# Patient Record
Sex: MALE | Race: BLACK OR AFRICAN AMERICAN | Employment: OTHER | URBAN - METROPOLITAN AREA
[De-identification: names, ages, dates, MRNs, and addresses within clinical notes are randomized per-mention and may not be internally consistent; named-entity substitution may affect disease eponyms.]

---

## 2018-05-24 ENCOUNTER — ANESTHESIA (OUTPATIENT)
Dept: ENDOSCOPY | Age: 78
End: 2018-05-24
Payer: MEDICARE

## 2018-05-24 ENCOUNTER — HOSPITAL ENCOUNTER (OUTPATIENT)
Age: 78
Setting detail: OUTPATIENT SURGERY
Discharge: HOME | End: 2018-05-24
Attending: SPECIALIST | Admitting: SPECIALIST
Payer: MEDICARE

## 2018-05-24 ENCOUNTER — ANESTHESIA EVENT (OUTPATIENT)
Dept: ENDOSCOPY | Age: 78
End: 2018-05-24
Payer: MEDICARE

## 2018-05-24 VITALS
SYSTOLIC BLOOD PRESSURE: 133 MMHG | BODY MASS INDEX: 22.4 KG/M2 | OXYGEN SATURATION: 97 % | HEIGHT: 71 IN | WEIGHT: 160 LBS | DIASTOLIC BLOOD PRESSURE: 79 MMHG | HEART RATE: 72 BPM | RESPIRATION RATE: 13 BRPM | TEMPERATURE: 97.9 F

## 2018-05-24 PROCEDURE — 74011250636 HC RX REV CODE- 250/636

## 2018-05-24 PROCEDURE — 77030027957 HC TBNG IRR ENDOGTR BUSS -B: Performed by: SPECIALIST

## 2018-05-24 PROCEDURE — 76040000019: Performed by: SPECIALIST

## 2018-05-24 PROCEDURE — 76060000031 HC ANESTHESIA FIRST 0.5 HR: Performed by: SPECIALIST

## 2018-05-24 RX ORDER — METOPROLOL TARTRATE 100 MG/1
100 TABLET ORAL 2 TIMES DAILY
COMMUNITY

## 2018-05-24 RX ORDER — METFORMIN HYDROCHLORIDE 1000 MG/1
1000 TABLET ORAL 2 TIMES DAILY WITH MEALS
COMMUNITY

## 2018-05-24 RX ORDER — CLOPIDOGREL BISULFATE 75 MG/1
75 TABLET ORAL DAILY
COMMUNITY

## 2018-05-24 RX ORDER — ROSUVASTATIN CALCIUM 20 MG/1
20 TABLET, COATED ORAL DAILY
COMMUNITY

## 2018-05-24 RX ORDER — OMEPRAZOLE 40 MG/1
40 CAPSULE, DELAYED RELEASE ORAL DAILY
COMMUNITY
End: 2018-07-09

## 2018-05-24 RX ORDER — PROPOFOL 10 MG/ML
INJECTION, EMULSION INTRAVENOUS AS NEEDED
Status: DISCONTINUED | OUTPATIENT
Start: 2018-05-24 | End: 2018-05-24 | Stop reason: HOSPADM

## 2018-05-24 RX ORDER — LISINOPRIL 40 MG/1
40 TABLET ORAL DAILY
COMMUNITY

## 2018-05-24 RX ORDER — SODIUM CHLORIDE 9 MG/ML
INJECTION, SOLUTION INTRAVENOUS
Status: DISCONTINUED | OUTPATIENT
Start: 2018-05-24 | End: 2018-05-24 | Stop reason: HOSPADM

## 2018-05-24 RX ORDER — HYDROCHLOROTHIAZIDE 25 MG/1
25 TABLET ORAL DAILY
COMMUNITY

## 2018-05-24 RX ADMIN — PROPOFOL 30 MG: 10 INJECTION, EMULSION INTRAVENOUS at 15:58

## 2018-05-24 RX ADMIN — SODIUM CHLORIDE: 9 INJECTION, SOLUTION INTRAVENOUS at 15:53

## 2018-05-24 RX ADMIN — PROPOFOL 50 MG: 10 INJECTION, EMULSION INTRAVENOUS at 16:01

## 2018-05-24 RX ADMIN — PROPOFOL 70 MG: 10 INJECTION, EMULSION INTRAVENOUS at 15:57

## 2018-05-24 NOTE — PROGRESS NOTES
Spoke with Dr. Marian Mistry regarding pt taking plavix yesterday. Pt also states he drank his prep at 6pm, & ate a spaghetti dinner at 8pm last night, however, he states his stool is liquid at this time. Explained to pt that we will not be able to remove polyps today due to anticoagulation & offered for him to reschedule. He states he would prefer to attempt procedure today. Dr. Cynthia Boyd at bedside to place IV.

## 2018-05-24 NOTE — H&P
Colonoscopy History and Physical      The patient was seen and examined. Date of last colonoscopy: none, Polyps  No      The airway was assessed and documented. The problem list, past medical history, and medications were reviewed. There is no problem list on file for this patient. Social History     Social History    Marital status: SINGLE     Spouse name: N/A    Number of children: N/A    Years of education: N/A     Occupational History    Not on file. Social History Main Topics    Smoking status: Current Every Day Smoker     Packs/day: 0.50     Years: 65.00    Smokeless tobacco: Never Used    Alcohol use No    Drug use: Not on file    Sexual activity: Not on file     Other Topics Concern    Not on file     Social History Narrative    No narrative on file     Past Medical History:   Diagnosis Date    [facility]     Atherosclerotic cerebrovascular disease     Chronic kidney disease     Diabetes ([facility]     Dizziness     Hypertension     Stroke ([facility] [facility]     weaker right leg    Thrombocytosis ([facility] [address] [facility]          Prior to Admission Medications   Prescriptions Last Dose Informant Patient Reported? Taking? clopidogrel (PLAVIX) 75 mg tab 2018 at am  Yes Yes   Sig: Take 75 mg by mouth daily. hydroCHLOROthiazide (HYDRODIURIL) 25 mg tablet 2018 at Unknown time  Yes Yes   Sig: Take 25 mg by mouth daily. insulin detemir U-100 (LEVEMIR FLEXTOUCH U-100 INSULN) 100 unit/mL (3 mL) inpn 2018 at Unknown time  Yes Yes   Si Units by SubCUTAneous route daily. lisinopril (PRINIVIL, ZESTRIL) 40 mg tablet 2018 at Unknown time  Yes Yes   Sig: Take 40 mg by mouth daily. metFORMIN (GLUCOPHAGE) 1,000 mg tablet 2018 at Unknown time  Yes Yes   Sig: Take 1,000 mg by mouth two (2) times daily (with meals). metoprolol tartrate (LOPRESSOR) 100 mg IR tablet 2018 at Unknown time  Yes Yes   Sig: Take  by mouth two (2) times a day.    omeprazole (PRILOSEC) 40 mg capsule 5/23/2018 at Unknown time  Yes Yes   Sig: Take 40 mg by mouth daily. rosuvastatin (CRESTOR) 20 mg tablet 5/23/2018 at Unknown time  Yes Yes   Sig: Take 20 mg by mouth daily. Facility-Administered Medications: None       The patient was seen and examined in the endoscopy suite. The airway was assessed and docuemented. The problem list and medications were reviewed. Chief complaint, history of present illness, and review of systems and Past medical History are positive for: change in bowel habits and weight loss. The heart, lungs and mental status were satisfactory for the administration of sedation and for the procedure. I discussed with the patient the objectives, risks, consequences and alternatives to the procedure.      Plan: Endoscopic procedure with sedation     Abel Azul MD   5/24/2018  3:58 PM

## 2018-05-24 NOTE — PROGRESS NOTES
Received report from anesthesia staff on vital signs and status of patient.     Scope precleaned at bedside by Carolin Torres

## 2018-05-24 NOTE — ANESTHESIA POSTPROCEDURE EVALUATION
Post-Anesthesia Evaluation and Assessment    Patient: Thuy Aaron MRN: [id number]  SSN: [id number]    YOB: 1940  Age: 68 y.o. Sex: male       Cardiovascular Function/Vital Signs  Visit Vitals    /74    Pulse 77    Temp 36.5 °C (97.7 °F)    Resp 16    Ht 5' 11\" (1.803 m)    Wt 72.6 kg (160 lb)    SpO2 98%    BMI 22.32 kg/m2       Patient is status post MAC anesthesia for Procedure(s):  SIGMOIDOSCOPY FLEXIBLE. Nausea/Vomiting: None    Postoperative hydration reviewed and adequate. Pain:  Pain Scale 1: Numeric (0 - 10) (05/24/18 1531)  Pain Intensity 1: 0 (05/24/18 1531)   Managed    Neurological Status: At baseline    Mental Status and Level of Consciousness: Arousable    Pulmonary Status:   O2 Device: Nasal cannula (05/24/18 1606)   Adequate oxygenation and airway patent    Complications related to anesthesia: None    Post-anesthesia assessment completed.  No concerns    Signed By: Rojelio Melo MD     May 24, 2018

## 2018-05-24 NOTE — ANESTHESIA PREPROCEDURE EVALUATION
Anesthetic History   No history of anesthetic complications            Review of Systems / Medical History  Patient summary reviewed, nursing notes reviewed and pertinent labs reviewed    Pulmonary          Smoker         Neuro/Psych       CVA (mild right sided weakness)       Cardiovascular    Hypertension          PAD    Exercise tolerance: >4 METS     GI/Hepatic/Renal         Renal disease: CRI       Endo/Other    Diabetes         Other Findings              Physical Exam    Airway  Mallampati: II  TM Distance: 4 - 6 cm  Neck ROM: normal range of motion   Mouth opening: Normal     Cardiovascular    Rhythm: regular  Rate: normal         Dental    Dentition: Full upper dentures and Poor dentition     Pulmonary  Breath sounds clear to auscultation               Abdominal  GI exam deferred       Other Findings            Anesthetic Plan    ASA: 3  Anesthesia type: MAC          Induction: Intravenous  Anesthetic plan and risks discussed with: Patient

## 2018-05-24 NOTE — PROCEDURES
[address]  [address]                 Colonoscopy Procedure Note    Indications:   See Preoperative Diagnosis above  Referring Physician: Yohannes Verdugo MD  Anesthesia/Sedation: MAC anesthesia Propofol  Endoscopist:  Dr. Berenice Renee  Assistant:  Endoscopy Technician-1: Chalino Ferrell  Endoscopy RN-1: Terra Haskins RN  Preoperative diagnosis: COLON  Postoperative diagnosis: Incomplete Prep    Procedure in Detail:  Informed consent was obtained for the procedure, including sedation. Risks of perforation, hemorrhage, adverse drug reaction, and aspiration were discussed. The patient was placed in the left lateral decubitus position. Based on the pre-procedure assessment, including review of the patient's medical history, medications, allergies, and review of systems, he had been deemed to be an appropriate candidate for moderate sedation; he was therefore sedated with the medications listed above. The patient was monitored continuously with ECG tracing, pulse oximetry, blood pressure monitoring, and direct observations. A rectal examination was performed. The [id number] was inserted into the rectum and advanced under direct vision to the sigmoid colon. The quality of the colonic preparation was poor. A careful inspection was made as the colonoscope was withdrawn, including a retroflexed view of the rectum; findings and interventions are described below. Appropriate photodocumentation was not obtained. Findings:  Rectum: poor prep  Sigmoid: poor prep, procedure terminated in sigmoid colon. Specimens:    none    EBL: None    Complications: None; patient tolerated the procedure well.     Recommendations:   Patient was instructed to call my office to reschedule colonoscopy and stop Plavix 5 days before colonoscopy        Signed By: Berenice Renee MD                        May 24, 2018

## 2018-05-24 NOTE — DISCHARGE INSTRUCTIONS
Yvette Kenney  [id number]  1940    COLON DISCHARGE INSTRUCTIONS  Discomfort:  Redness at IV site- apply warm compress to area; if redness or soreness persist- contact your physician  There may be a slight amount of blood passed from the rectum  Gaseous discomfort- walking, belching will help relieve any discomfort  You may not operate a vehicle for 12 hours  You may not engage in an occupation involving machinery or appliances for rest of today  You may not drink alcoholic beverages for at least 12 hours  Avoid making any critical decisions for at least 24 hour  DIET:   Regular diet. - however -  remember your colon is empty and a heavy meal will produce gas. Avoid these foods:  vegetables, fried / greasy foods, carbonated drinks for today. MEDICATIONS:      Regarding Aspirin or Nonsteroidal medications, please see below. ACTIVITY:  You may resume your normal daily activities it is recommended that you spend the remainder of the day resting -  avoid any strenuous activity. CALL M.D. ANY SIGN OF:  Increasing pain, nausea, vomiting  Abdominal distension (swelling)  New increased bleeding (oral or rectal)  Fever (chills)  Pain in chest area  Bloody discharge from nose or mouth  Shortness of breath    You may  take any Advil, Aspirin, Ibuprofen, Motrin, Aleve, or   Tylenol as needed for pain. Follow-up Instructions:   Call Dr. Albertina Senior office to reschedule colonoscopy and stop Plavix 5 days before colonoscopy.       DISCHARGE SUMMARY from Nurse    The following personal items collected during your admission are returned to you:   Dental Appliance: Dental Appliances: None  Vision: Visual Aid: Glasses (in bag, per pt)  Hearing Aid:    Jewelry:    Clothing:    Other Valuables:    Valuables sent to safe:

## 2018-05-24 NOTE — IP AVS SNAPSHOT
[address] [facility] [address] [place] [address] 
[phone] Patient: Jairo Avila. MRN: [id number] About your hospitalization You were admitted on:  May 24, 2018 You last received care in the:  [facility] ENDOSCOPY You were discharged on:  May 24, 2018 Why you were hospitalized Your primary diagnosis was:  Not on File Follow-up Information None Discharge Orders None A check raj indicates which time of day the medication should be taken. My Medications CONTINUE taking these medications Instructions Each Dose to Equal  
 Morning Noon Evening Bedtime  
 clopidogrel 75 mg Tab Commonly known as:  PLAVIX Your last dose was: Your next dose is: Take 75 mg by mouth daily. 75 mg  
    
   
   
   
  
 hydroCHLOROthiazide 25 mg tablet Commonly known as:  HYDRODIURIL Your last dose was: Your next dose is: Take 25 mg by mouth daily. 25 mg  
    
   
   
   
  
 LEVEMIR FLEXTOUCH U-100 INSULN 100 unit/mL (3 mL) Inpn Generic drug:  insulin detemir U-100 Your last dose was: Your next dose is:    
   
   
 38 Units by SubCUTAneous route daily. 38 Units  
    
   
   
   
  
 lisinopril 40 mg tablet Commonly known as:  Riana Dowdy Your last dose was: Your next dose is: Take 40 mg by mouth daily. 40 mg  
    
   
   
   
  
 metFORMIN 1,000 mg tablet Commonly known as:  GLUCOPHAGE Your last dose was: Your next dose is: Take 1,000 mg by mouth two (2) times daily (with meals). 1000 mg  
    
   
   
   
  
 metoprolol tartrate 100 mg IR tablet Commonly known as:  LOPRESSOR Your last dose was: Your next dose is: Take  by mouth two (2) times a day. omeprazole 40 mg capsule Commonly known as:  PRILOSEC  
   
 Your last dose was: Your next dose is: Take 40 mg by mouth daily. 40 mg  
    
   
   
   
  
 rosuvastatin 20 mg tablet Commonly known as:  CRESTOR Your last dose was: Your next dose is: Take 20 mg by mouth daily. 20 mg Discharge Instructions Venancio Mckenzie. 
[id number] 
1940 COLON DISCHARGE INSTRUCTIONS Discomfort: 
Redness at IV site- apply warm compress to area; if redness or soreness persist- contact your physician There may be a slight amount of blood passed from the rectum Gaseous discomfort- walking, belching will help relieve any discomfort You may not operate a vehicle for 12 hours You may not engage in an occupation involving machinery or appliances for rest of today You may not drink alcoholic beverages for at least 12 hours Avoid making any critical decisions for at least 24 hour DIET: 
 Regular diet.  however -  remember your colon is empty and a heavy meal will produce gas. Avoid these foods:  vegetables, fried / greasy foods, carbonated drinks for today. MEDICATIONS: 
  
 Regarding Aspirin or Nonsteroidal medications, please see below. ACTIVITY: 
You may resume your normal daily activities it is recommended that you spend the remainder of the day resting -  avoid any strenuous activity. CALL M.D. ANY SIGN OF: Increasing pain, nausea, vomiting Abdominal distension (swelling) New increased bleeding (oral or rectal) Fever (chills) Pain in chest area Bloody discharge from nose or mouth Shortness of breath You may  take any Advil, Aspirin, Ibuprofen, Motrin, Aleve, or   Tylenol as needed for pain. Follow-up Instructions: 
 Call Dr. Caroline Segovia office to reschedule colonoscopy and stop Plavix 5 days before colonoscopy. DISCHARGE SUMMARY from Nurse The following personal items collected during your admission are returned to you: Dental Appliance: Dental Appliances: None Vision: Visual Aid: Glasses (in bag, per pt) Hearing Aid:   
Jewelry:   
Clothing:   
Other Valuables:   
Valuables sent to safe:   
 
 
 
 
  
  
  
Introducing [facility]! [employer] introduces 19pay patient portal. Now you can access parts of your medical record, email your doctor's office, and request medication refills online. 1. In your internet browser, go to [email or web address]. [place]/[employer] 2. Click on the First Time User? Click Here link in the Sign In box. You will see the New Member Sign Up page. 3. Enter your 19pay Access Code exactly as it appears below. You will not need to use this code after youve completed the sign-up process. If you do not sign up before the expiration date, you must request a new code. · 19pay Access Code: [email or web address] Expires: 8/22/2018  4:23 PM 
 
4. Enter the last four digits of your Social Security Number (xxxx) and Date of Birth (mm/dd/yyyy) as indicated and click Submit. You will be taken to the next sign-up page. 5. Create a 19pay ID. This will be your 19pay login ID and cannot be changed, so think of one that is secure and easy to remember. 6. Create a 19pay password. You can change your password at any time. 7. Enter your Password Reset Question and Answer. This can be used at a later time if you forget your password. 8. Enter your e-mail address. You will receive e-mail notification when new information is available in [address] 9. Click Sign Up. You can now view and download portions of your medical record. 10. Click the Download Summary menu link to download a portable copy of your medical information. If you have questions, please visit the Frequently Asked Questions section of the 19pay website. Remember, 19pay is NOT to be used for urgent needs. For medical emergencies, dial 911. Now available from your iPhone and Android! Introducing [employer] As a [employer] patient, I wanted to make you aware of our electronic visit tool called [employer]. [employer] 24/7 allows you to connect within minutes with a medical provider 24 hours a day, seven days a week via a mobile device or tablet or logging into a secure website from your computer. You can access [employer] from anywhere in the [place]. A virtual visit might be right for you when you have a simple condition and feel like you just dont want to get out of bed, or cant get away from work for an appointment, when your regular [employer] provider is not available (evenings, weekends or holidays), or when youre out of town and need minor care. Electronic visits cost only $49 and if the [employer] 24/7 provider determines a prescription is needed to treat your condition, one can be electronically transmitted to a nearby pharmacy*. Please take a moment to enroll today if you have not already done so. The enrollment process is free and takes just a few minutes. To enroll, please download the [employer] 24/7 iker to your tablet or phone, or visit [email or web address] to enroll on your computer. And, as an [address] [place] [address] patient with a [employer] account, the results of your visits will be scanned into your electronic medical record and your primary care provider will be able to view the scanned results. We urge you to continue to see your regular [employer] provider for your ongoing medical care. And while your primary care provider may not be the one available when you seek a [employer] virtual visit, the peace of mind you get from getting a real diagnosis real time can be priceless. For more information on [employer], view our Frequently Asked Questions (FAQs) at [email or web address]. Sincerely, 
 
Ginny Pathak MD 
Chief Medical Officer [facility] *:  certain medications cannot be prescribed via [employer] Providers Seen During Your Hospitalization Provider Specialty Primary office phone Cherise Schulz MD Gastroenterology [phone] Your Primary Care Physician (PCP) Primary Care Physician Office Phone Office Fax Carrol Cordon [phone] You are allergic to the following No active allergies Recent Documentation Height Weight BMI Smoking Status 1.803 m 72.6 kg 22.32 kg/m2 Current Every Day Smoker Emergency Contacts Name Discharge Info Relation Home Work Mobile [address] CAREGIVER [3] Son [22] [phone] Patient Belongings The following personal items are in your possession at time of discharge: 
  Dental Appliances: None  Visual Aid: Glasses (in bag, per pt) Please provide this summary of care documentation to your next provider. Signatures-by signing, you are acknowledging that this After Visit Summary has been reviewed with you and you have received a copy. Patient Signature:  ____________________________________________________________ Date:  ____________________________________________________________  
  
Mariana Alexander Provider Signature:  ____________________________________________________________ Date:  ____________________________________________________________

## 2018-08-14 ENCOUNTER — HOSPITAL ENCOUNTER (OUTPATIENT)
Age: 78
Setting detail: OUTPATIENT SURGERY
Discharge: HOME | End: 2018-08-14
Attending: SPECIALIST | Admitting: SPECIALIST
Payer: MEDICARE

## 2018-08-14 ENCOUNTER — ANESTHESIA EVENT (OUTPATIENT)
Dept: ENDOSCOPY | Age: 78
End: 2018-08-14
Payer: MEDICARE

## 2018-08-14 ENCOUNTER — ANESTHESIA (OUTPATIENT)
Dept: ENDOSCOPY | Age: 78
End: 2018-08-14
Payer: MEDICARE

## 2018-08-14 VITALS
DIASTOLIC BLOOD PRESSURE: 77 MMHG | HEART RATE: 67 BPM | RESPIRATION RATE: 15 BRPM | SYSTOLIC BLOOD PRESSURE: 140 MMHG | OXYGEN SATURATION: 98 % | TEMPERATURE: 97.5 F

## 2018-08-14 LAB
GLUCOSE BLD STRIP.AUTO-MCNC: 137 MG/DL (ref 65–100)
GLUCOSE BLD STRIP.AUTO-MCNC: 66 MG/DL (ref 65–100)
SERVICE CMNT-IMP: ABNORMAL
SERVICE CMNT-IMP: NORMAL

## 2018-08-14 PROCEDURE — 77030027957 HC TBNG IRR ENDOGTR BUSS -B: Performed by: SPECIALIST

## 2018-08-14 PROCEDURE — 76040000019: Performed by: SPECIALIST

## 2018-08-14 PROCEDURE — 76060000031 HC ANESTHESIA FIRST 0.5 HR: Performed by: SPECIALIST

## 2018-08-14 PROCEDURE — 74011250636 HC RX REV CODE- 250/636

## 2018-08-14 PROCEDURE — 82962 GLUCOSE BLOOD TEST: CPT

## 2018-08-14 RX ORDER — MIDAZOLAM HYDROCHLORIDE 1 MG/ML
.25-1 INJECTION, SOLUTION INTRAMUSCULAR; INTRAVENOUS
Status: DISCONTINUED | OUTPATIENT
Start: 2018-08-14 | End: 2018-08-14 | Stop reason: HOSPADM

## 2018-08-14 RX ORDER — PROPOFOL 10 MG/ML
INJECTION, EMULSION INTRAVENOUS AS NEEDED
Status: DISCONTINUED | OUTPATIENT
Start: 2018-08-14 | End: 2018-08-14 | Stop reason: HOSPADM

## 2018-08-14 RX ORDER — LIDOCAINE HYDROCHLORIDE 20 MG/ML
INJECTION, SOLUTION EPIDURAL; INFILTRATION; INTRACAUDAL; PERINEURAL AS NEEDED
Status: DISCONTINUED | OUTPATIENT
Start: 2018-08-14 | End: 2018-08-14 | Stop reason: HOSPADM

## 2018-08-14 RX ORDER — EPINEPHRINE 0.1 MG/ML
1 INJECTION INTRACARDIAC; INTRAVENOUS
Status: DISCONTINUED | OUTPATIENT
Start: 2018-08-14 | End: 2018-08-14 | Stop reason: HOSPADM

## 2018-08-14 RX ORDER — FENTANYL CITRATE 50 UG/ML
200 INJECTION, SOLUTION INTRAMUSCULAR; INTRAVENOUS
Status: DISCONTINUED | OUTPATIENT
Start: 2018-08-14 | End: 2018-08-14 | Stop reason: HOSPADM

## 2018-08-14 RX ORDER — SODIUM CHLORIDE 9 MG/ML
50 INJECTION, SOLUTION INTRAVENOUS CONTINUOUS
Status: DISCONTINUED | OUTPATIENT
Start: 2018-08-14 | End: 2018-08-14 | Stop reason: HOSPADM

## 2018-08-14 RX ORDER — FLUMAZENIL 0.1 MG/ML
0.2 INJECTION INTRAVENOUS
Status: DISCONTINUED | OUTPATIENT
Start: 2018-08-14 | End: 2018-08-14 | Stop reason: HOSPADM

## 2018-08-14 RX ORDER — ATROPINE SULFATE 0.1 MG/ML
0.5 INJECTION INTRAVENOUS
Status: DISCONTINUED | OUTPATIENT
Start: 2018-08-14 | End: 2018-08-14 | Stop reason: HOSPADM

## 2018-08-14 RX ORDER — DEXTROMETHORPHAN/PSEUDOEPHED 2.5-7.5/.8
1.2 DROPS ORAL
Status: DISCONTINUED | OUTPATIENT
Start: 2018-08-14 | End: 2018-08-14 | Stop reason: HOSPADM

## 2018-08-14 RX ORDER — SODIUM CHLORIDE 9 MG/ML
INJECTION, SOLUTION INTRAVENOUS
Status: DISCONTINUED | OUTPATIENT
Start: 2018-08-14 | End: 2018-08-14 | Stop reason: HOSPADM

## 2018-08-14 RX ORDER — SODIUM CHLORIDE 0.9 % (FLUSH) 0.9 %
5-10 SYRINGE (ML) INJECTION AS NEEDED
Status: DISCONTINUED | OUTPATIENT
Start: 2018-08-14 | End: 2018-08-14 | Stop reason: HOSPADM

## 2018-08-14 RX ORDER — NALOXONE HYDROCHLORIDE 0.4 MG/ML
0.4 INJECTION, SOLUTION INTRAMUSCULAR; INTRAVENOUS; SUBCUTANEOUS
Status: DISCONTINUED | OUTPATIENT
Start: 2018-08-14 | End: 2018-08-14 | Stop reason: HOSPADM

## 2018-08-14 RX ORDER — SODIUM CHLORIDE 0.9 % (FLUSH) 0.9 %
5-10 SYRINGE (ML) INJECTION EVERY 8 HOURS
Status: DISCONTINUED | OUTPATIENT
Start: 2018-08-14 | End: 2018-08-14 | Stop reason: HOSPADM

## 2018-08-14 RX ADMIN — PROPOFOL 50 MG: 10 INJECTION, EMULSION INTRAVENOUS at 13:10

## 2018-08-14 RX ADMIN — LIDOCAINE HYDROCHLORIDE 40 MG: 20 INJECTION, SOLUTION EPIDURAL; INFILTRATION; INTRACAUDAL; PERINEURAL at 13:10

## 2018-08-14 RX ADMIN — SODIUM CHLORIDE: 9 INJECTION, SOLUTION INTRAVENOUS at 12:59

## 2018-08-14 NOTE — H&P
Colonoscopy History and Physical      The patient was seen and examined. Date of last colonoscopy: , Polyps  No      The airway was assessed and documented. The problem list, past medical history, and medications were reviewed. There is no problem list on file for this patient. Social History     Social History    Marital status: SINGLE     Spouse name: N/A    Number of children: N/A    Years of education: N/A     Occupational History    Not on file. Social History Main Topics    Smoking status: Current Every Day Smoker     Packs/day: 0.50     Years: 65.00    Smokeless tobacco: Never Used    Alcohol use No    Drug use: Yes     Special: IV, Heroin      Comment: quit     Sexual activity: Not on file     Other Topics Concern    Not on file     Social History Narrative     Past Medical History:   Diagnosis Date    Aortic atherosclerosis ([facility] [address]     Atherosclerotic cerebrovascular disease     Chronic kidney disease     Diabetes ([facility] [facility]     Dizziness     Hypertension     Stroke ([facility] [address]     weaker right leg    Thrombocytosis ([facility] [address] [facility]          Prior to Admission Medications   Prescriptions Last Dose Informant Patient Reported? Taking? amLODIPine (NORVASC) 10 mg tablet 2018 at Unknown time  Yes Yes   Sig: Take 10 mg by mouth daily. clopidogrel (PLAVIX) 75 mg tab 2018  Yes No   Sig: Take 75 mg by mouth daily. hydroCHLOROthiazide (HYDRODIURIL) 25 mg tablet 2018 at Unknown time  Yes Yes   Sig: Take 25 mg by mouth daily. insulin detemir U-100 (LEVEMIR FLEXTOUCH U-100 INSULN) 100 unit/mL (3 mL) inpn 2018 at Unknown time  Yes Yes   Si Units by SubCUTAneous route nightly. lisinopril (PRINIVIL, ZESTRIL) 40 mg tablet 2018 at Unknown time  Yes Yes   Sig: Take 40 mg by mouth daily. metFORMIN (GLUCOPHAGE) 1,000 mg tablet 2018 at Unknown time  Yes Yes   Sig: Take 1,000 mg by mouth two (2) times daily (with meals).    metoprolol tartrate (LOPRESSOR) 100 mg IR tablet 8/14/2018 at Unknown time  Yes Yes   Sig: Take 100 mg by mouth two (2) times a day. omeprazole (PRILOSEC) 20 mg capsule 8/14/2018 at Unknown time  Yes Yes   Sig: Take 20 mg by mouth daily. rosuvastatin (CRESTOR) 20 mg tablet 8/14/2018 at Unknown time  Yes Yes   Sig: Take 20 mg by mouth daily. Facility-Administered Medications: None       The patient was seen and examined in the endoscopy suite. The airway was assessed and docuemented. The problem list and medications were reviewed. Chief complaint, history of present illness, and review of systems and Past medical History are positive for: change in bowel habits, stroke. and weight loss    The heart, lungs and mental status were satisfactory for the administration of sedation and for the procedure. I discussed with the patient the objectives, risks, consequences and alternatives to the procedure.      Plan: Endoscopic procedure with sedation     Epi Beck MD   8/14/2018  1:10 PM

## 2018-08-14 NOTE — DISCHARGE INSTRUCTIONS
Nay Pan  [id number]  1940    COLON DISCHARGE INSTRUCTIONS  Discomfort:  Redness at IV site- apply warm compress to area; if redness or soreness persist- contact your physician  There may be a slight amount of blood passed from the rectum  Gaseous discomfort- walking, belching will help relieve any discomfort  You may not operate a vehicle for 12 hours  You may not engage in an occupation involving machinery or appliances for rest of today  You may not drink alcoholic beverages for at least 12 hours  Avoid making any critical decisions for at least 24 hour  DIET:   Regular diet. - however -  remember your colon is empty and a heavy meal will produce gas. Avoid these foods:  vegetables, fried / greasy foods, carbonated drinks for today. MEDICATIONS:      Regarding Aspirin or Nonsteroidal medications, please see below. ACTIVITY:  You may resume your normal daily activities it is recommended that you spend the remainder of the day resting -  avoid any strenuous activity. CALL M.D. ANY SIGN OF:  Increasing pain, nausea, vomiting  Abdominal distension (swelling)  New increased bleeding (oral or rectal)  Fever (chills)  Pain in chest area  Bloody discharge from nose or mouth  Shortness of breath    You may  take any Advil, Aspirin, Ibuprofen, Motrin, Aleve, or Goodys for 10 days, ONLY  Tylenol as needed for pain.       Follow-up Instructions:   Call Dr. Arianne Vasquez office to set up colonoscopy appointment    Telephone #  [phone]      Javier Emmanuel from Nurse    The following personal items collected during your admission are returned to you:   Dental Appliance: Dental Appliances: None  Vision: Visual Aid: None  Hearing Aid:    Jewelry:    Clothing:    Other Valuables:    Valuables sent to safe:

## 2018-08-14 NOTE — IP AVS SNAPSHOT
[address] 
[phone] Patient: Raya Monge. MRN: [id number] ACL:[birth year]21 About your hospitalization You were admitted on:  August 14, 2018 You last received care in the:  [facility] ENDOSCOPY You were discharged on:  August 14, 2018 Why you were hospitalized Your primary diagnosis was:  Not on File Follow-up Information None Discharge Orders None A check raj indicates which time of day the medication should be taken. My Medications CONTINUE taking these medications Instructions Each Dose to Equal  
 Morning Noon Evening Bedtime  
 amLODIPine 10 mg tablet Commonly known as:  Izetta Harder Your last dose was: Your next dose is: Take 10 mg by mouth daily. 10 mg  
    
   
   
   
  
 clopidogrel 75 mg Tab Commonly known as:  PLAVIX Your last dose was: Your next dose is: Take 75 mg by mouth daily. 75 mg  
    
   
   
   
  
 hydroCHLOROthiazide 25 mg tablet Commonly known as:  HYDRODIURIL Your last dose was: Your next dose is: Take 25 mg by mouth daily. 25 mg  
    
   
   
   
  
 LEVEMIR FLEXTOUCH U-100 INSULN 100 unit/mL (3 mL) Inpn Generic drug:  insulin detemir U-100 Your last dose was: Your next dose is:    
   
   
 38 Units by SubCUTAneous route nightly. 38 Units  
    
   
   
   
  
 lisinopril 40 mg tablet Commonly known as:  Rivera Quyen Your last dose was: Your next dose is: Take 40 mg by mouth daily. 40 mg  
    
   
   
   
  
 metFORMIN 1,000 mg tablet Commonly known as:  GLUCOPHAGE Your last dose was: Your next dose is: Take 1,000 mg by mouth two (2) times daily (with meals). 1000 mg  
    
   
   
   
  
 metoprolol tartrate 100 mg IR tablet Commonly known as:  LOPRESSOR  
   
 Your last dose was: Your next dose is: Take 100 mg by mouth two (2) times a day. 100 mg  
    
   
   
   
  
 omeprazole 20 mg capsule Commonly known as:  PRILOSEC Your last dose was: Your next dose is: Take 20 mg by mouth daily. 20 mg  
    
   
   
   
  
 rosuvastatin 20 mg tablet Commonly known as:  CRESTOR Your last dose was: Your next dose is: Take 20 mg by mouth daily. 20 mg Discharge Instructions Raya Stuart 
[id number] 
1940 COLON DISCHARGE INSTRUCTIONS Discomfort: 
Redness at IV site- apply warm compress to area; if redness or soreness persist- contact your physician There may be a slight amount of blood passed from the rectum Gaseous discomfort- walking, belching will help relieve any discomfort You may not operate a vehicle for 12 hours You may not engage in an occupation involving machinery or appliances for rest of today You may not drink alcoholic beverages for at least 12 hours Avoid making any critical decisions for at least 24 hour DIET: 
 Regular diet.  however -  remember your colon is empty and a heavy meal will produce gas. Avoid these foods:  vegetables, fried / greasy foods, carbonated drinks for today. MEDICATIONS: 
  
 Regarding Aspirin or Nonsteroidal medications, please see below. ACTIVITY: 
You may resume your normal daily activities it is recommended that you spend the remainder of the day resting -  avoid any strenuous activity. CALL M.D. ANY SIGN OF: Increasing pain, nausea, vomiting Abdominal distension (swelling) New increased bleeding (oral or rectal) Fever (chills) Pain in chest area Bloody discharge from nose or mouth Shortness of breath You may  take any Advil, Aspirin, Ibuprofen, Motrin, Aleve, or Goodys for 10 days, ONLY  Tylenol as needed for pain. Follow-up Instructions: Call Dr. Og Smith office to set up colonoscopy appointment Telephone #  [phone] DISCHARGE SUMMARY from Nurse The following personal items collected during your admission are returned to you:  
Dental Appliance: Dental Appliances: None Vision: Visual Aid: None Hearing Aid:   
Jewelry:   
Clothing:   
Other Valuables:   
Valuables sent to safe:   
 
 
 
 
  
  
  
Introducing [facility] SERVICES! [facility] introduces [employer]Works patient portal. Now you can access parts of your medical record, email your doctor's office, and request medication refills online. 1. In your internet browser, go to [email or web address] 2. Click on the First Time User? Click Here link in the Sign In box. You will see the New Member Sign Up page. 3. Enter your SecureWorks Access Code exactly as it appears below. You will not need to use this code after youve completed the sign-up process. If you do not sign up before the expiration date, you must request a new code. · SecureWorks Access Code: [email or web address] Expires: 8/22/2018  4:23 PM 
 
4. Enter the last four digits of your Social Security Number (xxxx) and Date of Birth (mm/dd/yyyy) as indicated and click Submit. You will be taken to the next sign-up page. 5. Create a SecureWorks ID. This will be your SecureWorks login ID and cannot be changed, so think of one that is secure and easy to remember. 6. Create a SecureWorks password. You can change your password at any time. 7. Enter your Password Reset Question and Answer. This can be used at a later time if you forget your password. 8. Enter your e-mail address. You will receive e-mail notification when new information is available in [address] 9. Click Sign Up. You can now view and download portions of your medical record. 10. Click the Download Summary menu link to download a portable copy of your medical information.  
 
If you have questions, please visit the Frequently Asked Questions section of the [employer]. Remember, MyChart is NOT to be used for urgent needs. For medical emergencies, dial 911. Now available from your iPhone and Android! Introducing Ke Chi As a [facility] patient, I wanted to make you aware of our electronic visit tool called Ke Chi. [facility] 24/7 allows you to connect within minutes with a medical provider 24 hours a day, seven days a week via a mobile device or tablet or logging into a secure website from your computer. You can access Ke Chi from anywhere in the [place]. A virtual visit might be right for you when you have a simple condition and feel like you just dont want to get out of bed, or cant get away from work for an appointment, when your regular [facility] provider is not available (evenings, weekends or holidays), or when youre out of town and need minor care. Electronic visits cost only $49 and if the [facility] 24/7 provider determines a prescription is needed to treat your condition, one can be electronically transmitted to a nearby pharmacy*. Please take a moment to enroll today if you have not already done so. The enrollment process is free and takes just a few minutes. To enroll, please download the [facility] 24/7 iker to your tablet or phone, or visit [email or web address] to enroll on your computer. And, as an [address] [place] [address] patient with a [employer] account, the results of your visits will be scanned into your electronic medical record and your primary care provider will be able to view the scanned results. We urge you to continue to see your regular [facility] provider for your ongoing medical care. And while your primary care provider may not be the one available when you seek a Ke Chi virtual visit, the peace of mind you get from getting a real diagnosis real time can be priceless. For more information on Ke Chi, view our Frequently Asked Questions (FAQs) at [email or web address]. Sincerely, 
 
Chandra Caballero MD 
Chief Medical Officer [address] *:  certain medications cannot be prescribed via Ke Chi Providers Seen During Your Hospitalization Provider Specialty Primary office phone Kareem Miranda MD Gastroenterology [phone] Your Primary Care Physician (PCP) Primary Care Physician Office Phone Office Fax Juan Jose [facility] [phone] You are allergic to the following No active allergies Recent Documentation Smoking Status Current Every Day Smoker Emergency Contacts Name Discharge Info Relation Home Work Mobile [address] CAREGIVER [3] Son [22] [phone] Patient Belongings The following personal items are in your possession at time of discharge: 
  Dental Appliances: None  Visual Aid: None Please provide this summary of care documentation to your next provider. Signatures-by signing, you are acknowledging that this After Visit Summary has been reviewed with you and you have received a copy. Patient Signature:  ____________________________________________________________ Date:  ____________________________________________________________  
  
Jackelyn Daniels Provider Signature:  ____________________________________________________________ Date:  ____________________________________________________________

## 2018-08-14 NOTE — PROCEDURES
[address], [address] [place] [address]                 Colonoscopy Procedure Note    Indications:   See Preoperative Diagnosis above  Referring Physician: Steve Menjivar MD  Anesthesia/Sedation: MAC anesthesia Propofol  Endoscopist:  Dr. Ailyn Corado  Assistant:  Endoscopy Technician-1: Cristino Sommers IV  Endoscopy RN-1: Gina More RN  Preoperative diagnosis: Abnormal intentional weight loss [R63.4]  Altered bowel function in premature  [P96.89, R19.4]  Postoperative diagnosis: 1. inadequate prep    Procedure in Detail:  Informed consent was obtained for the procedure, including sedation. Risks of perforation, hemorrhage, adverse drug reaction, and aspiration were discussed. The patient was placed in the left lateral decubitus position. Based on the pre-procedure assessment, including review of the patient's medical history, medications, allergies, and review of systems, he had been deemed to be an appropriate candidate for moderate sedation; he was therefore sedated with the medications listed above. The patient was monitored continuously with ECG tracing, pulse oximetry, blood pressure monitoring, and direct observations. A rectal examination was performed. The [id number] was inserted into the rectum and advanced under direct vision to the sigmoid colon. The quality of the colonic preparation was poor. A careful inspection was made as the colonoscope was withdrawn, including a retroflexed view of the rectum; findings and interventions are described below. Appropriate photodocumentation was not obtained. Findings:  Rectum: Poor prep  Sigmoid: Poor prep, procedure terminated in sigmoid colon    Specimens:    none    EBL: None    Complications: None; patient tolerated the procedure well.     Recommendations:      - Repeat Colon after 2 day prep        Signed By: Ailyn Corado MD                        2018

## 2018-08-14 NOTE — ANESTHESIA POSTPROCEDURE EVALUATION
Post-Anesthesia Evaluation and Assessment    Patient: Miranda Alva. MRN: [id number]  SSN: [id number]    YOB: 1940  Age: 68 y.o. Sex: male       Cardiovascular Function/Vital Signs  Visit Vitals    /81    Pulse 71    Temp 36.4 °C (97.5 °F)    Resp 11    SpO2 97%       Patient is status post MAC anesthesia for Procedure(s):  COLONOSCOPY. Nausea/Vomiting: None    Postoperative hydration reviewed and adequate. Pain:  Pain Scale 1: Numeric (0 - 10) (08/14/18 1156)  Pain Intensity 1: 0 (08/14/18 1156)   Managed    Neurological Status: At baseline    Mental Status and Level of Consciousness: Arousable    Pulmonary Status:   O2 Device: Nasal cannula (08/14/18 1322)   Adequate oxygenation and airway patent    Complications related to anesthesia: None    Post-anesthesia assessment completed.  No concerns    Signed By: Lucio Goodman MD     August 14, 2018

## 2022-07-26 ENCOUNTER — APPOINTMENT (OUTPATIENT)
Dept: GENERAL RADIOLOGY | Age: 82
End: 2022-07-26
Attending: EMERGENCY MEDICINE
Payer: MEDICARE

## 2022-07-26 ENCOUNTER — HOSPITAL ENCOUNTER (EMERGENCY)
Age: 82
Discharge: HOME | End: 2022-07-26
Attending: EMERGENCY MEDICINE | Admitting: EMERGENCY MEDICINE
Payer: MEDICARE

## 2022-07-26 VITALS
SYSTOLIC BLOOD PRESSURE: 170 MMHG | OXYGEN SATURATION: 99 % | TEMPERATURE: 97.8 F | RESPIRATION RATE: 18 BRPM | DIASTOLIC BLOOD PRESSURE: 90 MMHG | HEART RATE: 70 BPM

## 2022-07-26 DIAGNOSIS — S92.514B OPEN NONDISPLACED FRACTURE OF PROXIMAL PHALANX OF LESSER TOE OF RIGHT FOOT, INITIAL ENCOUNTER: Primary | ICD-10-CM

## 2022-07-26 DIAGNOSIS — S91.116A LACERATION OF FIFTH TOE: ICD-10-CM

## 2022-07-26 PROCEDURE — 74011250636 HC RX REV CODE- 250/636: Performed by: EMERGENCY MEDICINE

## 2022-07-26 PROCEDURE — 74011000250 HC RX REV CODE- 250

## 2022-07-26 PROCEDURE — 90471 IMMUNIZATION ADMIN: CPT | Performed by: EMERGENCY MEDICINE

## 2022-07-26 PROCEDURE — 74011000250 HC RX REV CODE- 250: Performed by: EMERGENCY MEDICINE

## 2022-07-26 PROCEDURE — 96372 THER/PROPH/DIAG INJ SC/IM: CPT | Performed by: EMERGENCY MEDICINE

## 2022-07-26 PROCEDURE — 73660 X-RAY EXAM OF TOE(S): CPT

## 2022-07-26 PROCEDURE — 99284 EMERGENCY DEPT VISIT MOD MDM: CPT | Performed by: EMERGENCY MEDICINE

## 2022-07-26 PROCEDURE — 90714 TD VACC NO PRESV 7 YRS+ IM: CPT | Performed by: EMERGENCY MEDICINE

## 2022-07-26 PROCEDURE — 75810000293 HC SIMP/SUPERF WND  RPR: Performed by: EMERGENCY MEDICINE

## 2022-07-26 RX ORDER — CEFAZOLIN SODIUM 1 G/3ML
1 INJECTION, POWDER, FOR SOLUTION INTRAMUSCULAR; INTRAVENOUS
Status: COMPLETED | OUTPATIENT
Start: 2022-07-26 | End: 2022-07-26

## 2022-07-26 RX ORDER — WATER FOR INJECTION,STERILE
VIAL (ML) INJECTION
Status: COMPLETED
Start: 2022-07-26 | End: 2022-07-26

## 2022-07-26 RX ORDER — LIDOCAINE HYDROCHLORIDE 10 MG/ML
5 INJECTION INFILTRATION; PERINEURAL ONCE
Status: COMPLETED | OUTPATIENT
Start: 2022-07-26 | End: 2022-07-26

## 2022-07-26 RX ORDER — CEPHALEXIN 500 MG/1
500 CAPSULE ORAL 3 TIMES DAILY
Qty: 21 CAPSULE | Refills: 0 | Status: SHIPPED | OUTPATIENT
Start: 2022-07-26 | End: 2022-08-02

## 2022-07-26 RX ADMIN — WATER 2.5 ML: 1 INJECTION INTRAMUSCULAR; INTRAVENOUS; SUBCUTANEOUS at 06:40

## 2022-07-26 RX ADMIN — LIDOCAINE HYDROCHLORIDE 5 ML: 10 INJECTION, SOLUTION INFILTRATION; PERINEURAL at 06:18

## 2022-07-26 RX ADMIN — TETANUS AND DIPHTHERIA TOXOIDS ADSORBED 0.5 ML: 2; 2 INJECTION INTRAMUSCULAR at 06:17

## 2022-07-26 RX ADMIN — CEFAZOLIN 1 G: 1 INJECTION, POWDER, FOR SOLUTION INTRAMUSCULAR; INTRAVENOUS at 06:40

## 2022-07-26 NOTE — ED NOTES
Pt discharged in stable condition in [employer]. Discharge instructions and scripts reviewed, pt verbalized understanding.

## 2022-07-26 NOTE — ED PROVIDER NOTES
55-year-old male with a history of chronic kidney disease, hypertension, diabetes presents with complaints of right fifth toe laceration after fall in his dark bedroom in the middle of the night while getting up to use the restroom. Denies injury elsewhere. Denies head trauma, loss of consciousness. No other complaints. Denies any recent illness, fever, chills, nausea, vomiting, chest pain, shortness of breath. Primary care-[facility]  Regular tobacco use, former drug use, occasional alcohol use       Past Medical History:   Diagnosis Date    Aortic atherosclerosis ([facility] [address]     Atherosclerotic cerebrovascular disease     Chronic kidney disease     Diabetes ([facility] [address] [facility]     Dizziness     Hypertension     Stroke ([facility] [address] [facility]     weaker right leg    Thrombocytosis ([facility] [address] [facility]        Past Surgical History:   Procedure Laterality Date    COLONOSCOPY N/A 8/14/2018    COLONOSCOPY performed by Dale Arreaga MD at [place] [address] N/A 5/24/2018    SIGMOIDOSCOPY FLEXIBLE performed by Dale Arreaga MD at [facility]           No family history on file.     Social History     Socioeconomic History    Marital status: SINGLE     Spouse name: Not on file    Number of children: Not on file    Years of education: Not on file    Highest education level: Not on file   Occupational History    Not on file   Tobacco Use    Smoking status: Every Day     Packs/day: 0.50     Years: 65.00     Pack years: 32.50     Types: Cigarettes    Smokeless tobacco: Never   Substance and Sexual Activity    Alcohol use: No    Drug use: Yes     Types: IV, Heroin     Comment: quit 1998    Sexual activity: Not on file   Other Topics Concern    Not on file   Social History Narrative    Not on file     Social Determinants of Health     Financial Resource Strain: Not on file   Food Insecurity: Not on file   Transportation Needs: Not on file   Physical Activity: Not on file   Stress: Not on file   Social Connections: Not on file Intimate Partner Violence: Not on file   Housing Stability: Not on file         ALLERGIES: Patient has no known allergies. Review of Systems   Constitutional:  Negative for chills and fever. Respiratory:  Negative for cough and shortness of breath. Cardiovascular:  Negative for chest pain. Gastrointestinal:  Negative for abdominal pain, nausea and vomiting. Genitourinary:  Negative for dysuria and urgency. Skin:  Positive for wound. Neurological:  Negative for seizures and headaches. Vitals:    07/26/22 0537   BP: (!) 170/90   Pulse: 70   Resp: 18   Temp: 97.8 °F (36.6 °C)   SpO2: 99%            Physical Exam  Vitals and nursing note reviewed. Constitutional:       Appearance: He is well-developed. HENT:      Head: Normocephalic and atraumatic. Eyes:      Pupils: Pupils are equal, round, and reactive to light. Cardiovascular:      Rate and Rhythm: Normal rate and regular rhythm. Heart sounds: Normal heart sounds. Pulmonary:      Effort: Pulmonary effort is normal. No respiratory distress. Breath sounds: Normal breath sounds. No wheezing. Abdominal:      General: Bowel sounds are normal.      Palpations: Abdomen is soft. Tenderness: There is no abdominal tenderness. There is no guarding or rebound. Musculoskeletal:         General: Normal range of motion. Cervical back: Normal range of motion and neck supple. Comments: Right fifth toe laceration plantar aspect, deep, no active bleeding, no tenderness to palpation, toe with increased passive range of motion. Skin:     General: Skin is warm and dry. Neurological:      Mental Status: He is alert and oriented to person, place, and time.    Psychiatric:         Behavior: Behavior normal.        MDM  Number of Diagnoses or Management Options  Laceration of fifth toe  Open nondisplaced fracture of proximal phalanx of lesser toe of right foot, initial encounter  Diagnosis management comments: 25-year-old male with diabetes, hypertension, chronic kidney disease presents after fall and dark bedroom with laceration to right fifth toe. No head trauma or loss of consciousness. Denies pain elsewhere. Patient is well-appearing, no acute distress, hemodynamically stable, afebrile, nontoxic, no respiratory distress, clear to auscultation bilaterally, right foot fifth toe with plantar aspect laceration, long toenail right foot fifth toe likely snagged object during fall causing laceration and fracture. Nondisplaced fracture proximal phalanx of fifth toe, right foot. Open fracture. Wound thoroughly irrigated with saline, no active bleeding, wound closed, started on antibiotics for open fracture and advised to follow-up with podiatry for wound care and general foot health and hygiene. Wound Repair    Date/Time: 7/26/2022 7:03 AM  Performed by: attendingLocation details: right little toe  Wound length:2.6 - 7.5 cm    Anesthesia:  Local Anesthetic: lidocaine 1% without epinephrine  Anesthetic total: 5 mL  Foreign bodies: no foreign bodies  Irrigation solution: saline  Irrigation method: syringe  Skin closure: Prolene  Number of sutures: 7  Technique: simple and interrupted  Approximation: close  Dressing: gauze roll  My total time at bedside, performing this procedure was 16-30 minutes. 7:07 AM  Patient's results have been reviewed with them. Patient and/or family have verbally conveyed their understanding and agreement of the patient's signs, symptoms, diagnosis, treatment and prognosis and additionally agree to follow up as recommended or return to the Emergency Room should their condition change prior to follow-up. Discharge instructions have also been provided to the patient with some educational information regarding their diagnosis as well a list of reasons why they would want to return to the ER prior to their follow-up appointment should their condition change.

## 2022-07-26 NOTE — ED TRIAGE NOTES
Pt arrives via EMS for a ground level fall this morning around 0330. States he got out of bed to use the bathroom and the room was dark and he tripped and fell. Denies feeling dizzy prior to fall. Denies hitting his head or losing consciousness. Reports numbness in his right 5th toe. A&OX4.

## 2022-08-15 ENCOUNTER — TRANSCRIBE ORDER (OUTPATIENT)
Dept: SCHEDULING | Age: 82
End: 2022-08-15

## 2022-08-15 DIAGNOSIS — R60.0 LOCALIZED EDEMA: Primary | ICD-10-CM

## 2022-08-16 ENCOUNTER — HOSPITAL ENCOUNTER (OUTPATIENT)
Dept: VASCULAR SURGERY | Age: 82
Discharge: HOME | End: 2022-08-16
Attending: NURSE PRACTITIONER
Payer: MEDICARE

## 2022-08-16 ENCOUNTER — HOSPITAL ENCOUNTER (EMERGENCY)
Age: 82
Discharge: HOME | End: 2022-08-16
Attending: STUDENT IN AN ORGANIZED HEALTH CARE EDUCATION/TRAINING PROGRAM
Payer: MEDICARE

## 2022-08-16 VITALS
DIASTOLIC BLOOD PRESSURE: 83 MMHG | OXYGEN SATURATION: 99 % | HEART RATE: 61 BPM | TEMPERATURE: 97.6 F | RESPIRATION RATE: 16 BRPM | SYSTOLIC BLOOD PRESSURE: 167 MMHG

## 2022-08-16 DIAGNOSIS — I82.411 ACUTE DEEP VEIN THROMBOSIS (DVT) OF FEMORAL VEIN OF RIGHT LOWER EXTREMITY (HCC): Primary | ICD-10-CM

## 2022-08-16 DIAGNOSIS — R60.0 LOCALIZED EDEMA: ICD-10-CM

## 2022-08-16 LAB
ALBUMIN SERPL-MCNC: 3.5 G/DL (ref 3.5–5)
ALBUMIN/GLOB SERPL: 0.7 {RATIO} (ref 1.1–2.2)
ALP SERPL-CCNC: 70 U/L (ref 45–117)
ALT SERPL-CCNC: 31 U/L (ref 12–78)
ANION GAP SERPL CALC-SCNC: 7 MMOL/L (ref 5–15)
AST SERPL-CCNC: 39 U/L (ref 15–37)
BASOPHILS # BLD: 0.1 K/UL (ref 0–0.1)
BASOPHILS NFR BLD: 1 % (ref 0–1)
BILIRUB SERPL-MCNC: 0.3 MG/DL (ref 0.2–1)
BUN SERPL-MCNC: 11 MG/DL (ref 6–20)
BUN/CREAT SERPL: 8 (ref 12–20)
CALCIUM SERPL-MCNC: 9 MG/DL (ref 8.5–10.1)
CHLORIDE SERPL-SCNC: 108 MMOL/L (ref 97–108)
CO2 SERPL-SCNC: 23 MMOL/L (ref 21–32)
CREAT SERPL-MCNC: 1.33 MG/DL (ref 0.7–1.3)
DIFFERENTIAL METHOD BLD: ABNORMAL
EOSINOPHIL # BLD: 0.2 K/UL (ref 0–0.4)
EOSINOPHIL NFR BLD: 3 % (ref 0–7)
ERYTHROCYTE [DISTWIDTH] IN BLOOD BY AUTOMATED COUNT: 16.4 % (ref 11.5–14.5)
GLOBULIN SER CALC-MCNC: 4.7 G/DL (ref 2–4)
GLUCOSE SERPL-MCNC: 95 MG/DL (ref 65–100)
HCT VFR BLD AUTO: 38.4 % (ref 36.6–50.3)
HGB BLD-MCNC: 13 G/DL (ref 12.1–17)
IMM GRANULOCYTES # BLD AUTO: 0 K/UL (ref 0–0.04)
IMM GRANULOCYTES NFR BLD AUTO: 0 % (ref 0–0.5)
LYMPHOCYTES # BLD: 3.1 K/UL (ref 0.8–3.5)
LYMPHOCYTES NFR BLD: 44 % (ref 12–49)
MCH RBC QN AUTO: 28.2 PG (ref 26–34)
MCHC RBC AUTO-ENTMCNC: 33.9 G/DL (ref 30–36.5)
MCV RBC AUTO: 83.3 FL (ref 80–99)
MONOCYTES # BLD: 0.6 K/UL (ref 0–1)
MONOCYTES NFR BLD: 8 % (ref 5–13)
NEUTS SEG # BLD: 3.2 K/UL (ref 1.8–8)
NEUTS SEG NFR BLD: 44 % (ref 32–75)
NRBC # BLD: 0 K/UL (ref 0–0.01)
NRBC BLD-RTO: 0 PER 100 WBC
PLATELET # BLD AUTO: 336 K/UL (ref 150–400)
PMV BLD AUTO: 9.5 FL (ref 8.9–12.9)
POTASSIUM SERPL-SCNC: 4.2 MMOL/L (ref 3.5–5.1)
PROT SERPL-MCNC: 8.2 G/DL (ref 6.4–8.2)
RBC # BLD AUTO: 4.61 M/UL (ref 4.1–5.7)
SODIUM SERPL-SCNC: 138 MMOL/L (ref 136–145)
WBC # BLD AUTO: 7.2 K/UL (ref 4.1–11.1)

## 2022-08-16 PROCEDURE — 93970 EXTREMITY STUDY: CPT

## 2022-08-16 PROCEDURE — 36415 COLL VENOUS BLD VENIPUNCTURE: CPT

## 2022-08-16 PROCEDURE — 80053 COMPREHEN METABOLIC PANEL: CPT

## 2022-08-16 PROCEDURE — 85025 COMPLETE CBC W/AUTO DIFF WBC: CPT

## 2022-08-16 PROCEDURE — 99283 EMERGENCY DEPT VISIT LOW MDM: CPT

## 2022-08-16 RX ORDER — RIVAROXABAN 15 MG-20MG
KIT ORAL
Qty: 1 DOSE PACK | Refills: 0 | Status: SHIPPED | OUTPATIENT
Start: 2022-08-16

## 2022-08-16 NOTE — ED TRIAGE NOTES
Pt c/o right leg swelling for months, pt had doppler done today that showed a right femoral and popliteal vein thrombosis , is currently on blood thinners that he knows of, denies cp or sob, pt also fell out of his bed last week and thinks he broke his right pinky toe

## 2022-08-16 NOTE — PROGRESS NOTES
2:33 PM  CM consulted to assist with patient transport home. Address verified. Informed patient is ambulatory. Referral placed to Round Trip. Patient to be picked up at ED Entrance.    to contact patient registration in route and upon arrival.  ETA: 2:45 pm.    Liana Caldwell, MSW/CRM  Care Management

## 2022-08-16 NOTE — ED PROVIDER NOTES
Please note that this dictation was completed with [employer], the computer voice recognition software. Quite often unanticipated grammatical, syntax, homophones, and other interpretive errors are inadvertently transcribed by the computer software. Please disregard these errors. Please excuse any errors that have escaped final proofreading. Patient is an 51-year-old male with history of prior stroke, hypertension, diabetes, CKD, presenting to ED for evaluation of abnormal outpatient ultrasound which was done today. He states that he has had swelling in both of his legs for several months and his primary care doctor ordered an outpatient DVT study which was positive for a right femoral-popliteal DVT. He denies any pain in his legs. He states he takes Plavix daily, unsure why he takes this, but denies any known additional anticoagulants. He denies chest pain or shortness of breath. Denies any prior blood clots, denies any recent surgeries or travel, did break his toe 3 weeks ago. Past Medical History:   Diagnosis Date    Aortic atherosclerosis ([facility] [address]     Atherosclerotic cerebrovascular disease     Chronic kidney disease     Diabetes ([facility] [address]     Dizziness     Hypertension     Stroke [facility])     weaker right leg    Thrombocytosis ([facility] [address]        Past Surgical History:   Procedure Laterality Date    COLONOSCOPY N/A 8/14/2018    COLONOSCOPY performed by Emily Palomo MD at [place] [id number] N/A 5/24/2018    SIGMOIDOSCOPY FLEXIBLE performed by Emily Palomo MD at [facility] ENDOSCOPY    [address]           No family history on file.     Social History     Socioeconomic History    Marital status: SINGLE     Spouse name: Not on file    Number of children: Not on file    Years of education: Not on file    Highest education level: Not on file   Occupational History    Not on file   Tobacco Use    Smoking status: Every Day     Packs/day: 0.50     Years: 65.00     Pack years: 32.50     Types: Cigarettes Smokeless tobacco: Never   Substance and Sexual Activity    Alcohol use: No    Drug use: Yes     Types: IV, Heroin     Comment: quit 1998    Sexual activity: Not on file   Other Topics Concern    Not on file   Social History Narrative    Not on file     Social Determinants of Health     Financial Resource Strain: Not on file   Food Insecurity: Not on file   Transportation Needs: Not on file   Physical Activity: Not on file   Stress: Not on file   Social Connections: Not on file   Intimate Partner Violence: Not on file   Housing Stability: Not on file         ALLERGIES: Patient has no known allergies. Review of Systems   Constitutional:  Negative for chills and fever. HENT:  Negative for congestion, ear pain and sore throat. Eyes:  Negative for visual disturbance. Respiratory:  Negative for cough and shortness of breath. Cardiovascular:  Positive for leg swelling. Negative for chest pain. Gastrointestinal:  Negative for abdominal pain, diarrhea, nausea and vomiting. Genitourinary:  Negative for dysuria and flank pain. Musculoskeletal:  Negative for back pain. Skin:  Negative for color change. Neurological:  Negative for dizziness and headaches. Psychiatric/Behavioral:  Negative for confusion. Vitals:    08/16/22 1109   BP: (!) 167/83   Pulse: 61   Resp: 16   Temp: 97.6 °F (36.4 °C)   SpO2: 99%            Physical Exam  Vitals and nursing note reviewed. Constitutional:       General: He is not in acute distress. Appearance: Normal appearance. He is not ill-appearing. HENT:      Head: Normocephalic and atraumatic. Eyes:      General: Vision grossly intact. Extraocular Movements: Extraocular movements intact. Conjunctiva/sclera: Conjunctivae normal.   Neck:      Trachea: Phonation normal.   Cardiovascular:      Rate and Rhythm: Normal rate and regular rhythm. Pulses: Normal pulses. Heart sounds: Normal heart sounds.       Comments: BLE pulses equal Pulmonary:      Effort: Pulmonary effort is normal.      Breath sounds: Normal breath sounds and air entry. Abdominal:      Palpations: Abdomen is soft. Tenderness: There is no abdominal tenderness. Musculoskeletal:         General: Normal range of motion. Right lower le+ Edema (nontender) present. Left lower le+ Edema present. Skin:     General: Skin is warm and dry. Neurological:      General: No focal deficit present. Mental Status: He is alert and oriented to person, place, and time. Psychiatric:         Behavior: Behavior normal.        MDM  Number of Diagnoses or Management Options  Acute deep vein thrombosis (DVT) of femoral vein of right lower extremity (HCC)  Diagnosis management comments: Patient is alert, afebrile, vital stable. Presents with bilateral leg swelling x3 months and positive DVT on outpatient ultrasound today. Ultrasound shows right-sided DVT in the femoral and popliteal veins. No clinical signs or symptoms concerning for PE. Lower extremity pulses equal bilaterally. Patient is not sure if he is on any blood thinning medications, Plavix is listed in his med list.    CONSULT NOTE:  1:59 PM RAMILA Thompson spoke with Justyn BACON,  pt's PCP with [facility]. Discussed available diagnostic tests and clinical findings. He is in agreement with care plans as outlined. Kamryn NP answer patient was treated for DVT 6 months ago with 45 days of Xarelto and tolerated the medication well, that clot was unprovoked. Not currently on Xarelto, he also adds he is not currently on Plavix or any additional antiplatelet/anticoagulant medications. PCP feels patient can safely be started on Xarelto again and we will follow-up with him in the clinic for further medication management. As this is patient's second possibly unprovoked DVT this year, we will also refer to hematology. Education given regarding risks of medication.   Discharged from ED in stable condition. Return precautions outlined. All questions answered at this time. Amount and/or Complexity of Data Reviewed  Discuss the patient with other providers: yes (Discussed patient with ED attending Cara Justice DO who agrees with current management plan.     )    2:04 PM  Pt has been reevaluated. There are no new complaints, changes, or physical findings at this time. All results have been reviewed with patient and/or family. Medications have been reviewed w/ pt and/or family. Pt and/or family's questions have been answered. Pt and/or family expressed good understanding of the dx/tx/rx and is in agreement with plan of care. Pt instructed and agreed to f/u w/ PCP, heme and to return to ED upon further deterioration. Return precautions outlined. All questions answered at this time. Pt is stable and ready for discharge. IMPRESSION:  1. Acute deep vein thrombosis (DVT) of femoral vein of right lower extremity (HCC)        PLAN:  1. Current Discharge Medication List        START taking these medications    Details   rivaroxaban (Xarelto DVT-PE Treat 30d Start) 15 mg (42)- 20 mg (9) DsPk Take one 15 mg tablet twice a day with food for the first 21 days. Then, take one 20 mg tablet once a day with food for 9 days. Qty: 1 Dose Pack, Refills: 0  Start date: 8/16/2022           2.    Follow-up Information       Follow up With Specialties Details Why Contact Info    ARLEEN Roe Nurse Practitioner Schedule an appointment as soon as possible for a visit   [address]      Simona Bhatt MD Hematology and Oncology, Internal Medicine Physician, Hematology Physician, Oncology Schedule an appointment as soon as possible for a visit   [address] [facility]  [address] [place] [address]  [phone]                Return to ED if worse          Procedures

## 2024-06-11 NOTE — PROGRESS NOTES
"Chief Complaint   Patient presents with    Hypothyroidism      HPI  Angelika Vick is a 37 y.o. female presents today for evaluation of hypothyroidism .  She was last seen 9/19/2023 by Dr. Gomes.  Patient was interested in trying T3 at the time.  She was advised to decrease levothyroxine 112 mcg and start liothyronine 5 mcg daily.  Repeat labs 10/17/2023 TSH: 4.28 FT4: 1.25, total T3: 98.  She was advised increase levothyroxine 125 mcg and continue liothyronine 5 mg.  She reports worsening symptoms after continuing liothyronine and stopped this medication after about a month or so.  She is continuing the levothyroxine 125 mcg since.      Most recent labs 5/5/2024 TSH: 6.560, FT4: 1.39 - no adjustment in medication at the time.     Reports since last visit had knee injury - taking Meloxicam daily since 3/2024 daily; has been less active during this time.     Admits fatigue, difficulty sleep (waking up frequently about 3 time/night with no explanation), appetite either low or increased worsened for past month. Admits intermittent palpitations for past few year- hx PVCs and following with cardiology. More skin breakouts - no changes in skin care routine. Admits mild weight fluctuation. Denies heat/cold intolerance, anxiety/depression, palpitations, abdominal pain, constipation, edema, changes in hair/nails, numbness/tingling.    LMP: \"last week\" monthly  Birth control: has IUD    Past medical history: Hypothyroidism, gestational diabetes    Hypothyroidism:   Diagnosed with Hashimoto's hypothyroidism and has been on thyroid medication since 2018.  Labs from 10/22/2018 thyroid antibody: >600.    - levothyroxine 125 mcg daily.   Reports good compliance with levothyroxine and takes it at the same time every day, on an empty stomach, with water.     - Denies thyroid imaging.   - Admits family history of thyroid disease: sister hypothyroidism  - Denies hx of radiation to head/neck.  - Denies taking biotin supplement. " 1230  Pt BS 66, made Dr Paris Newton aware. Pt asymptomatic. Dr. Paris Newton ordered D5 to drip and recheck in recovery. Initial RN admission and assessment performed and documented in Endoscopy navigator. Patient evaluated by anesthesia in pre-procedure holding. All procedural vital signs, airway assessment, and level of consciousness information monitored and recorded by anesthesia staff on the anesthesia record. Report received from CRNA post procedure. Patient transported to recovery area by RN. Endoscope was pre-cleaned at bedside immediately following procedure by martin 6/20/2023       RE: Arielle Montenegro  [address]  [place] [address]     Dear Colleague,    Thank you for referring your patient, Arielle Montenegro, to the [facility] [place] at [facility]. Please see a copy of my visit note below.    Outcome for 06/14/23 2:54 PM: Data uploaded on Dexcom  Laura Bruce MA  Outcome for 06/19/23 12:22 PM: Data obtained via Dexcom website  Karla Corral MA     Start time: 0734  End time: 0754  Provider location: off site- home  Patient location: off site- home.        HPI:   Arielle is a 38 yo woman here for follow up of type 1 diabetes with a history of severe hypoglycemia and hypoglycemia unawareness.  She reports that she was diagnosed with type 1 dm around age 7 when she became sick while living in [place]. She has been on insulin since diagnosis.   She has also sees Dr. Wasserman in weight management. Arielle reports that she is frustrated with her weight gain and wants to do whatever she can to lose weight.  She recently starting talking with our pharmacist, Lauro Barrios, who helped Arielle start up on her dexcom sensor and also started on ozempic.  She feels like it is not really helping her lose weight yet.  She is on 0.5 mg weekly. She is back on her sensor regularly after having a severe hypoglycemic event last month when she ended up passed out after going to bed while glucose was low.      Arielle has also been dealing with bronchitis and is on prednisone 20 mg.  She was prescribed a 5 day course of 20 mg, but missed a few doses and is taking her last dose today.  She still has a cough, but it is getting better.      She feels like her body does not respond to her insulin and she wonders if she needs a different type.      Her current treatment regimen is as follows:   Tresiba- 14 units daily.   Novolog- does not take it before she eats- 3-5 units, mostly 5.  She takes it after    - Denies high iodine diet.     Past Medical History:   Diagnosis Date    Acid reflux     Bilateral ovarian cysts     Depression     Gestational diabetes     with 2nd pregnancy    Gestational hypertension     with last pregnancy    Hashimoto's thyroiditis 2018    Hypertension in pregnancy 2012    Hypothyroidism 2018    Migraines     Mixed hyperlipidemia 2019    Polycystic ovary syndrome 2010    Vitamin D deficiency 2024     Past Surgical History:   Procedure Laterality Date    CYST REMOVAL      GANGLION CYST EXCISION Right       Family History   Problem Relation Age of Onset    Arthritis Mother     Hypertension Mother     No Known Problems Father     Thyroid disease Sister     Diabetes Sister     No Known Problems Brother     Diabetes Maternal Grandmother     Diabetes Maternal Grandfather     No Known Problems Paternal Grandmother     No Known Problems Paternal Grandfather     Diabetes Sister     Obesity Sister     Thyroid disease Sister       Social History     Socioeconomic History    Marital status:    Tobacco Use    Smoking status: Former     Current packs/day: 0.00     Average packs/day: 1 pack/day for 11.3 years (11.3 ttl pk-yrs)     Types: Cigarettes     Start date: 2005     Quit date: 10/6/2016     Years since quittin.6     Passive exposure: Past    Smokeless tobacco: Never   Vaping Use    Vaping status: Never Used   Substance and Sexual Activity    Alcohol use: Yes     Alcohol/week: 1.0 - 2.0 standard drink of alcohol     Types: 1 - 2 Drinks containing 0.5 oz of alcohol per week     Comment: Occasionally    Drug use: Yes     Types: Marijuana     Comment: Daily    Sexual activity: Yes     Partners: Male     Birth control/protection: I.U.D.      Allergies   Allergen Reactions    Dermagel Hand  [Alcohol] Rash      Current Outpatient Medications on File Prior to Visit   Medication Sig Dispense Refill    acetaminophen (TYLENOL) 500 MG tablet Take 80 mg by mouth Every 4  eating.  If she remembers, she takes it before she eats.     In-pen- would like to have extra insulin as a back-up, as she only has one In-pen.     Patient wears a Dexcom sensor with an overall average of 284 mg/dL (CV 38%, TIR 19%, hypo 1%, severe hypo 0%) over the past 2 weeks. Recent glucose is as follows:            Past Medical History:  Blindness of right eye  Type 1 diabetes  Hypoglycemia unawareness  Vitamin D deficiency  Anxiety  Depression  Overweight     Past Surgical History:   section - ,   Enucleation right -     Family History:   Diabetes - paternal side of family       Social History:     Kids now 10, 8 and 2 years old.  One son (8 year old) with ADHD and obese with prediabetes, not type 1.  Other might have autism.      Diabetes Control:   Lab Results   Component Value Date    A1C 5.8 2020    A1C 6.4 2020    A1C 7.5 2020    A1C 7.8 2020    A1C 12.6 2019       Past Medical History:   Diagnosis Date    Blindness of right eye     Diabetes mellitus type 1 (H)     Diagnosed as a child       Past Surgical History:   Procedure Laterality Date     SECTION  13     SECTION N/A 2015    Procedure:  SECTION;  Surgeon: John Hudson MD;  Location: [facility]     SECTION N/A 7/3/2020    Procedure:  SECTION;  Surgeon: pAarna Atkins MD;  Location: [facility]    ENUCLEATION Right 3/20/2015    Procedure: ENUCLEATION;  Surgeon: Edilson Islas MD;  Location: [facility]       Family History   Problem Relation Age of Onset    Family History Negative Mother     Family History Negative Father     Diabetes Other         father's side       Social History     Socioeconomic History    Marital status: Single    Number of children: 1   Occupational History     Employer: UNEMPLOYED   Tobacco Use    Smoking status: Former Smoker     Packs/day: 0.00     Types: Cigarettes    Smokeless tobacco: Never Used    Tobacco comment:  "(Four) Hours As Needed for Mild Pain.      metoprolol tartrate (LOPRESSOR) 25 MG tablet 1/2 -1 tablet po bid prn for pvcs, fast heart beat 60 tablet 3    [DISCONTINUED] amoxicillin-clavulanate (AUGMENTIN) 875-125 MG per tablet Take 1 tablet by mouth 2 (Two) Times a Day. 14 tablet 0    [DISCONTINUED] fluconazole (Diflucan) 100 MG tablet Take 1 tablet by mouth Daily. 1 tablet 1    [DISCONTINUED] levothyroxine (SYNTHROID, LEVOTHROID) 125 MCG tablet Take 1 tablet by mouth Daily. 30 tablet 5    [DISCONTINUED] predniSONE 5 MG (21) tablet therapy pack dose pack Take 1 tablet by mouth Take As Directed. Take as directed on package instructions. 21 tablet 0    Meloxicam 10 MG capsule Take  by mouth.       No current facility-administered medications on file prior to visit.        Review of Systems   Constitutional:  Positive for appetite change and fatigue. Negative for activity change, unexpected weight gain and unexpected weight loss.   HENT:  Negative for trouble swallowing.    Respiratory:  Negative for shortness of breath.    Cardiovascular:  Positive for palpitations. Negative for chest pain and leg swelling.   Gastrointestinal:  Negative for abdominal pain, constipation and diarrhea.   Endocrine: Negative for cold intolerance and heat intolerance.   Genitourinary:  Negative for menstrual problem.   Skin:  Negative for dry skin.        acne   Neurological:  Negative for tremors and numbness.   Psychiatric/Behavioral:  Positive for sleep disturbance. Negative for depressed mood. The patient is not nervous/anxious.         /82   Pulse 85   Ht 160 cm (63\")   Wt 85.1 kg (187 lb 9.6 oz)   SpO2 97%   BMI 33.23 kg/m²      Physical Exam  Constitutional:       Appearance: Normal appearance.   Neck:      Thyroid: No thyroid mass, thyromegaly or thyroid tenderness.   Cardiovascular:      Rate and Rhythm: Normal rate.   Pulmonary:      Effort: Pulmonary effort is normal.   Musculoskeletal:         General: Normal range " smokes 2 cigarettes/day-none for several months   Substance and Sexual Activity    Alcohol use: No     Alcohol/week: 0.0 standard drinks    Drug use: No    Sexual activity: Yes     Partners: Male     Birth control/protection: None   Social History Narrative    ** Merged History Encounter **         Caffeine intake/servings daily - 0    Calcium intake/servings daily - 3    Exercise 7 times weekly - describe walking    Sunscreen used - No    Seatbelts used - Yes    Guns stored in the home - No    Self Breast Exam - Yes    Pap test up to date -  No    Eye exam up to date -  Yes    Dental exam up to date -  Yes    DEXA scan up to date -  Not Applicable    Flex Sig/Colonoscopy up to date -  Not Applicable    Mammography up to date -  Not Applicable    Immunizations reviewed and up to date - No    Abuse: Current or Past (Physical, Sexual or Emotional) - No    Do you feel safe in your environment - Yes    Do you cope well with stress - Yes    Do you suffer from insomnia - No    Last updated by: KARL PELAEZ  7/18/2012                       Current Outpatient Medications   Medication    albuterol (PROAIR HFA/PROVENTIL HFA/VENTOLIN HFA) 108 (90 Base) MCG/ACT inhaler    alcohol swab prep pads    blood glucose (ACCU-CHEK GUIDE) test strip    blood glucose (NO BRAND SPECIFIED) test strip    blood glucose monitoring (NO BRAND SPECIFIED) meter device kit    blood glucose monitoring (SOFTCLIX) lancets    budesonide-formoterol (SYMBICORT) 160-4.5 MCG/ACT Inhaler    cetirizine (ZYRTEC) 10 MG tablet    Continuous Blood Gluc  (DEXCOM G6 ) TOMASA    Continuous Blood Gluc Sensor (DEXCOM G6 SENSOR) MISC    Continuous Blood Gluc Sensor (DEXCOM G7 SENSOR) MISC    Continuous Blood Gluc Transmit (DEXCOM G6 TRANSMITTER) MISC    Continuous Blood Gluc Transmit (DEXCOM G6 TRANSMITTER) MISC    FLUoxetine (PROZAC) 20 MG capsule    fluticasone (FLONASE) 50 MCG/ACT nasal spray    fluticasone-salmeterol (ADVAIR-HFA) 115-21 MCG/ACT  "of motion.   Skin:     General: Skin is warm and dry.   Neurological:      General: No focal deficit present.      Mental Status: She is alert and oriented to person, place, and time.   Psychiatric:         Mood and Affect: Mood normal.         Behavior: Behavior normal.         LABS AND IMAGING    CMP  Lab Results   Component Value Date    GLUCOSE 103 (H) 05/05/2024    BUN 14 05/05/2024    CREATININE 0.77 05/05/2024    EGFR 102.0 05/05/2024    BCR 18.2 05/05/2024    K 3.7 05/05/2024    CO2 23.0 05/05/2024    CALCIUM 9.2 05/05/2024    ALBUMIN 4.4 05/05/2024    AST 12 05/05/2024    ALT 12 05/05/2024        CBC w/DIFF   Lab Results   Component Value Date    WBC 11.67 (H) 05/05/2024    RBC 5.12 05/05/2024    HGB 14.9 05/05/2024    HCT 44.8 05/05/2024    MCV 87.5 05/05/2024    MCH 29.1 05/05/2024    MCHC 33.3 05/05/2024    RDW 12.2 (L) 05/05/2024    RDWSD 39.4 05/05/2024    MPV 9.1 05/05/2024     05/05/2024    NEUTRORELPCT 75.2 05/05/2024    LYMPHORELPCT 17.9 (L) 05/05/2024    MONORELPCT 5.7 05/05/2024    EOSRELPCT 0.4 05/05/2024    BASORELPCT 0.5 05/05/2024    AUTOIGPER 0.3 05/05/2024    NEUTROABS 8.77 (H) 05/05/2024    LYMPHSABS 2.09 05/05/2024    MONOSABS 0.66 05/05/2024    EOSABS 0.05 05/05/2024    BASOSABS 0.06 05/05/2024    AUTOIGNUM 0.04 05/05/2024    NRBC 0.0 05/05/2024       TSH  Lab Results   Component Value Date    TSH 6.560 (H) 05/05/2024    TSH 4.280 (H) 10/17/2023    TSH 1.150 08/09/2023       T4  Lab Results   Component Value Date    FREET4 1.39 05/05/2024    FREET4 1.25 10/17/2023    FREET4 1.50 08/09/2023     Lab Results   Component Value Date    G5LPPRP 5.00 10/17/2018       T3  No results found for: \"T3FREE\"  Lab Results   Component Value Date    Z1FKXER 98 10/17/2023       TPO  Lab Results   Component Value Date    THYROIDAB >600 (H) 10/22/2018       No valid procedures specified.    Assessment and Plan    Diagnoses and all orders for this visit:    1. Hypothyroidism due to Hashimoto's " inhaler    gabapentin (NEURONTIN) 100 MG capsule    Glucagon (BAQSIMI TWO PACK) 3 MG/DOSE POWD    Glucagon 3 MG/DOSE POWD    Insulin Degludec (TRESIBA) 100 UNIT/ML SOLN    insulin glargine (LANTUS SOLOSTAR) 100 UNIT/ML pen    insulin pen needle (31G X 5 MM) 31G X 5 MM miscellaneous    KETOSTIX test strip    naproxen (NAPROSYN) 500 MG tablet    NOVOLOG FLEXPEN 100 UNIT/ML soln    predniSONE (DELTASONE) 20 MG tablet    Semaglutide-Weight Management (WEGOVY) 0.5 MG/0.5ML pen    Semaglutide-Weight Management (WEGOVY) 1 MG/0.5ML pen    traZODone (DESYREL) 50 MG tablet    tretinoin (RETIN-A) 0.05 % external cream     No current facility-administered medications for this visit.        No Known Allergies    Physical Exam  LMP  (LMP Unknown)   GENERAL: healthy, alert and no distress  RESP: no audible wheeze, cough, or visible cyanosis.  No visible retractions or increased work of breathing.  Able to speak fully in complete sentences.  PSYCH: mentation appears normal, affect normal/bright, judgement and insight intact, normal speech and appearance well-groomed    RESULTS  Lab Results   Component Value Date    A1C 10.2 (H) 03/21/2023    A1C 10.1 (H) 06/22/2022    A1C 5.8 (H) 07/04/2020    A1C 6.4 (H) 06/11/2020    A1C 7.5 (H) 01/28/2020    A1C 7.8 (H) 01/02/2020    A1C 12.6 (H) 09/20/2019       TSH   Date Value Ref Range Status   05/30/2019 0.877 0.358 - 3.740 UIU/mL Final   05/11/2017 1.19 0.40 - 4.00 mU/L Final   09/30/2014 1.90 0.40 - 4.00 mU/L Final     Comment:     Effective 7/30/2014, the reference range for this assay has changed to reflect   new instrumentation/methodology.         ALT   Date Value Ref Range Status   06/09/2023 16 10 - 35 U/L Final   03/21/2023 19 10 - 35 U/L Final   07/05/2020 24 0 - 50 U/L Final   07/04/2020 27 0 - 50 U/L Final   ]    Recent Labs   Lab Test 09/20/19  1425 03/11/19  0000   CHOL 181 147.6   HDL 71 52.4   * 72   TRIG 45 115.8       Lab Results   Component Value Date      06/09/2023     09/20/2019      Lab Results   Component Value Date    POTASSIUM 4.0 06/09/2023    POTASSIUM 3.8 08/12/2021    POTASSIUM 4.0 01/24/2020     Lab Results   Component Value Date    CHLORIDE 106 06/09/2023    CHLORIDE 100 08/12/2021    CHLORIDE 95 09/20/2019     Lab Results   Component Value Date    ALEXANDRO 8.7 06/09/2023    ALEXANDRO 8.5 09/20/2019     Lab Results   Component Value Date    CO2 24 06/09/2023    CO2 25 08/12/2021    CO2 22 09/20/2019     Lab Results   Component Value Date    BUN 8.1 06/09/2023    BUN 12 08/12/2021    BUN 13 09/20/2019     Lab Results   Component Value Date    CR 0.76 06/09/2023    CR 0.91 07/05/2020       GFR Estimate   Date Value Ref Range Status   06/09/2023 >90 >60 mL/min/1.73m2 Final     Comment:     eGFR calculated using 2021 CKD-EPI equation.   05/20/2023 >90 >60 mL/min/1.73m2 Final     Comment:     eGFR calculated using 2021 CKD-EPI equation.   03/21/2023 77 >60 mL/min/1.73m2 Final     Comment:     eGFR calculated using 2021 CKD-EPI equation.   07/05/2020 82 >60 mL/min/[1.73_m2] Final     Comment:     Non  GFR Calc  Starting 12/18/2018, serum creatinine based estimated GFR (eGFR) will be   calculated using the Chronic Kidney Disease Epidemiology Collaboration   (CKD-EPI) equation.     07/04/2020 85 >60 mL/min/[1.73_m2] Final     Comment:     Non  GFR Calc  Starting 12/18/2018, serum creatinine based estimated GFR (eGFR) will be   calculated using the Chronic Kidney Disease Epidemiology Collaboration   (CKD-EPI) equation.     07/03/2020 75 >60 mL/min/[1.73_m2] Final     Comment:     Non  GFR Calc  Starting 12/18/2018, serum creatinine based estimated GFR (eGFR) will be   calculated using the Chronic Kidney Disease Epidemiology Collaboration   (CKD-EPI) equation.       GFR Estimate If Black   Date Value Ref Range Status   07/05/2020 >90 >60 mL/min/[1.73_m2] Final     Comment:      GFR Calc  Starting  thyroiditis (Primary)  Assessment & Plan:  Increased TSH 6.2 earlier this month with worsening fatigue, difficulty sleeping, appetite changes. Denies missed doses of medication.  Discussed possible weight fluctuation contributor.   Discussed monitoring vs increase levothyroxine 137 mcg - patient would like to see if improvement with dose increase.  Advised repeat TFT in 6 weeks for monitoring, contact office if concerns with dose change.  Not currently planning pregnancy - discussed need for T4 dose adjustments during times of pregnancy and advised to contact office if positive pregnancy test in future.    Orders:  -     T4, Free; Future  -     TSH; Future  -     levothyroxine (SYNTHROID, LEVOTHROID) 137 MCG tablet; Take 1 tablet by mouth Daily.  Dispense: 30 tablet; Refill: 2    2. Vitamin D deficiency  Assessment & Plan:  Possible contributor to chronic fatigue.  Advise restarting supplement vitamin D 25 mcg daily.  Recheck level in future labs.    Orders:  -     Vitamin D,25-Hydroxy; Future         Return in about 3 months (around 9/11/2024) for follow-up thyroid disease. The patient was instructed to contact the clinic with any interval questions or concerns.    Edith Carbajal PA-C   Endocrinology    Please note that portions of this note were completed with a voice recognition program.   12/18/2018, serum creatinine based estimated GFR (eGFR) will be   calculated using the Chronic Kidney Disease Epidemiology Collaboration   (CKD-EPI) equation.     07/04/2020 >90 >60 mL/min/[1.73_m2] Final     Comment:      GFR Calc  Starting 12/18/2018, serum creatinine based estimated GFR (eGFR) will be   calculated using the Chronic Kidney Disease Epidemiology Collaboration   (CKD-EPI) equation.     07/03/2020 87 >60 mL/min/[1.73_m2] Final     Comment:      GFR Calc  Starting 12/18/2018, serum creatinine based estimated GFR (eGFR) will be   calculated using the Chronic Kidney Disease Epidemiology Collaboration   (CKD-EPI) equation.         Lab Results   Component Value Date    MICROL 172 09/30/2014     No results found for: MICROALBUMIN  Lab Results   Component Value Date    CPEPT <0.1 (L) 09/30/2014       No results found for: B12]    Most recent eye exam date: : Not Found       Assessment/Plan:     1.  Type 1 diabetes-  Glucose is running high as she is currently on prednisone.  Doing better on Tresiba and not missing doses.  We spent most of our time discussing the importance of taking Novolog before her meals, rather than correcting after.  I reminded her never to take more than 5 units.  Will set up the In-pen with Lauro Barrios.  This will be very helpful.  We made the following plan today (instructions given to patient):    This month, please focus on taking Novolog 5 units before each meal.  Remember, if you do not take your insulin before you eat, your glucose will go up into the 300's.      Low blood sugars are likely causing weight gain.  We can lower your risk of having lows by taking Novolog BEFORE you eat.     Increase Ozempic to 1 mg weekly.     Schedule appointment with primary care to discuss your cough.      Emergency issues: Please contact the clinic as soon as you recognize a problem.  Here are some concerns you should contact us about.  -Vomiting: more than  twice.  Please check ketones.  If positive, go to ER. Monitor glucose hourly.   -High glucose (over 300 mg/dL twice in a row): Please check ketones.  If ketones are negative, take an insulin correction and recheck glucose in 1 hour.  If glucose is not coming down, please call the clinic. If ketones are moderate or large, drink lots of water, take an insulin correction, and recheck ketones in 1 hour.  If ketones are still high (or you are vomiting), go to the ER.  -Hypoglycemia (low glucose):   If glucose is less than 70 mg/dL, treat with 15g carb (4 glucose tablets), recheck glucose in 10 minutes.  If low again, repeat.   If glucose is less than 54 mg/dL, treat with 30g carb, recheck glucose in 10 minutes.  If low again, repeat.  Keep glucagon in your home in case of severe hypoglycemia and train someone how to use this.    Emergency kit (please ensure you always have these with you):   Glucose tablets  Glucagon  Insulin  Syringes (if on a pump)  Extra infusion set (if on a pump)  Ketone strips    Contact information:   If you have concerns, please send me a [employer] [facility] message or call the clinic at [phone].  For more urgent concerns, please call [phone] after hours/weekends and ask to speak with the endocrinologist on call.      Please let me know if you are having low blood sugars less than 70 or over 350 mg/dL.  Do not wait until your next appointment if this is happening.         2.  Risk factors-     Retinopathy:  No known history.  Scheduled for eye exam 3/2, but missed appt.  Encouraged her to reschedule.  Has not had exam in years.    Nephropathy:  BP historically well controlled. No microalbuminuria. Needs to recheck.  Creatinine stable. Overdue for labs.  Asked her to schedule.  Neuropathy: No.    Feet: OK, no ulcers.   Lipids:  LDL at target.   Thyroid screening: will check TSH. Feeling very tired.   Celiac screening: Does not appear to have been screened.  Will check antibodies.   Contraception:  "did not address today.   Depression: Met with health psychology and it was not a good fit.  Not interested in rescheduling.  \"I'm a good mom and take care of my kids.\" Needs to schedule annual physical with PCP.    She is overdue for labs and would like to reschedule.      3.  F/U in 2 mos with me, sooner with concerns/hypoglycemia.     32 minutes spent on the date of the encounter doing chart review, review of test results, patient visit and documentation, counseling/coordination of care, and discussion of follow up plan for worsening hyper and hypoglycemia.  The patient understood and is satisfied with today's visit.     Kym Jang PA-C, MPAS   [facility]  Department of Medicine  Division of Endocrinology and Diabetes          Virtual Visit Details    Type of service:  Telephone Visit     "

## (undated) DEVICE — 1200 GUARD II KIT W/5MM TUBE W/O VAC TUBE: Brand: GUARDIAN

## (undated) DEVICE — QUILTED PREMIUM COMFORT UNDERPAD,EXTRA HEAVY: Brand: WINGS

## (undated) DEVICE — KIT IV STRT W CHLORAPREP PD 1ML

## (undated) DEVICE — SOLIDIFIER FLUID 3000 CC ABSORB

## (undated) DEVICE — CONNECTOR TBNG AUX H2O JET DISP FOR OLY 160/180 SER

## (undated) DEVICE — BAG BELONG PT PERS CLEAR HANDL

## (undated) DEVICE — Z DISCONTINUED NO SUB IDED SET EXTN W/ 4 W STPCOCK M SPIN LOK 36IN

## (undated) DEVICE — SET ADMIN 16ML TBNG L100IN 2 Y INJ SITE IV PIGGY BK DISP

## (undated) DEVICE — KENDALL RADIOLUCENT FOAM MONITORING ELECTRODE -RECTANGULAR SHAPE: Brand: KENDALL

## (undated) DEVICE — AIRLIFE™ U/CONNECT-IT OXYGEN TUBING 7 FEET (2.1 M) CRUSH-RESISTANT OXYGEN TUBING, VINYL TIPPED: Brand: AIRLIFE™

## (undated) DEVICE — Z DISCONTINUED USE 2751540 TUBING IRRIG L10IN DISP PMP ENDOGATOR

## (undated) DEVICE — SYRINGE MED 20ML STD CLR PLAS LUERLOCK TIP N CTRL DISP

## (undated) DEVICE — CATH IV AUTOGRD BC BLU 22GA 25 -- INSYTE

## (undated) DEVICE — NEEDLE HYPO 18GA L1.5IN PNK S STL HUB POLYPR SHLD REG BVL

## (undated) DEVICE — Device

## (undated) DEVICE — CATH IV AUTOGRD BC YEL 24GA 19 -- INSYTE

## (undated) DEVICE — BW-412T DISP COMBO CLEANING BRUSH: Brand: SINGLE USE COMBINATION CLEANING BRUSH

## (undated) DEVICE — NEONATAL-ADULT SPO2 SENSOR: Brand: NELLCOR

## (undated) DEVICE — ENDO CARRY-ON PROCEDURE KIT INCLUDES ENZYMATIC SPONGE, GAUZE, BIOHAZARD LABEL, TRAY, LUBRICANT, DIRTY SCOPE LABEL, WATER LABEL, TRAY, DRAWSTRING PAD, AND DEFENDO 4-PIECE KIT.: Brand: ENDO CARRY-ON PROCEDURE KIT

## (undated) DEVICE — CANN NASAL O2 CAPNOGRAPHY AD -- FILTERLINE